# Patient Record
Sex: FEMALE | Race: WHITE
[De-identification: names, ages, dates, MRNs, and addresses within clinical notes are randomized per-mention and may not be internally consistent; named-entity substitution may affect disease eponyms.]

---

## 2017-09-26 ENCOUNTER — HOSPITAL ENCOUNTER (OUTPATIENT)
Dept: HOSPITAL 75 - RAD | Age: 42
End: 2017-09-26
Attending: OBSTETRICS & GYNECOLOGY
Payer: COMMERCIAL

## 2017-09-26 DIAGNOSIS — Z12.31: Primary | ICD-10-CM

## 2017-09-26 PROCEDURE — 77067 SCR MAMMO BI INCL CAD: CPT

## 2017-09-26 NOTE — DIAGNOSTIC IMAGING REPORT
Bilateral screening mammogram 2D views with tomosynthesis



The current study was also evaluated with a Computer Aided

Detection (CAD) system.



Indication: Screening. No current complaints stated on the

questionnaire.



COMPARISON: 9/27/16



FINDINGS:

The breasts are composed of heterogeneously dense parenchyma

which may decrease mammographic sensitivity. There is no mass,

architectural distortion or suspicious cluster of calcification

seen. Allowing for technique and positional differences, no

suspicious change is seen.



IMPRESSION: Dense breasts with no definite change. 



ACR BI-RADS Category 2: Benign findings.

Result letter will be mailed to the patient.

Note: At least 10% of breast cancer is not imaged by mammography.



 



Dictated by: 



  Dictated on workstation # [id number]

## 2018-10-09 ENCOUNTER — HOSPITAL ENCOUNTER (OUTPATIENT)
Dept: HOSPITAL 75 - RAD | Age: 43
End: 2018-10-09
Attending: OBSTETRICS & GYNECOLOGY
Payer: COMMERCIAL

## 2018-10-09 DIAGNOSIS — Z12.31: Primary | ICD-10-CM

## 2018-10-09 PROCEDURE — 77067 SCR MAMMO BI INCL CAD: CPT

## 2018-10-09 NOTE — DIAGNOSTIC IMAGING REPORT
INDICATION: Screening.



The current study was also evaluated with a Computer Aided

Detection (CAD) system. 3-D tomosynthesis was also performed and

reviewed.



COMPARISON: Comparison made with prior examinations from

09/26/2017 back through 08/27/2013.



FINDINGS: The fibroglandular tissue is heterogeneously dense

bilaterally. There is no new dominant mass, spiculated lesion, or

suspicious calcifications identified. The skin, nipples, and

axillae are unremarkable.



IMPRESSION: Benign.



ACR BI-RADS Category 2: Benign findings.

Result letter will be mailed to the patient.

Note: At least 10% of breast cancer is not imaged by mammography.



Dictated by: 



  Dictated on workstation # [id number]

## 2019-10-15 ENCOUNTER — HOSPITAL ENCOUNTER (OUTPATIENT)
Dept: HOSPITAL 75 - RAD | Age: 44
End: 2019-10-15
Attending: OBSTETRICS & GYNECOLOGY
Payer: COMMERCIAL

## 2019-10-15 DIAGNOSIS — Z12.31: Primary | ICD-10-CM

## 2019-10-15 PROCEDURE — 77067 SCR MAMMO BI INCL CAD: CPT

## 2019-10-15 NOTE — DIAGNOSTIC IMAGING REPORT
INDICATION: 

Routine screening.



COMPARISON:     

10/09/2018 and 09/26/2017.



TECHNIQUE: 

2D and 3D bilateral screening mammography was performed with CAD.



FINDINGS:

Both breasts remain heterogeneously dense, limiting the

sensitivity of mammography. The overall parenchymal pattern is

stable. No dominant mass or malignant appearing

microcalcifications are seen. A density in the medial aspect of

the right breast at mid depth on the CC view has been stable for

several years. There appears to be an intraparenchymal lymph node

in the upper outer left breast at posterior depth. The axillae

are unremarkable.



IMPRESSION: 

No mammographic features suspicious for malignancy are

identified.



ACR BI-RADS Category 2: Benign findings.

Result letter will be mailed to the patient.

Note: At least 10% of breast cancer is not imaged by mammography.



Dictated by: 



  Dictated on workstation # [id number]

## 2020-06-29 ENCOUNTER — HOSPITAL ENCOUNTER (OUTPATIENT)
Dept: HOSPITAL 75 - PREOP | Age: 45
Discharge: HOME | End: 2020-06-29
Attending: SURGERY
Payer: COMMERCIAL

## 2020-06-29 VITALS — WEIGHT: 175.27 LBS | BODY MASS INDEX: 29.92 KG/M2 | HEIGHT: 64.02 IN

## 2020-06-29 DIAGNOSIS — Z01.812: Primary | ICD-10-CM

## 2020-06-29 DIAGNOSIS — Z20.828: ICD-10-CM

## 2020-06-29 DIAGNOSIS — Z80.0: ICD-10-CM

## 2020-06-29 PROCEDURE — 87635 SARS-COV-2 COVID-19 AMP PRB: CPT

## 2020-07-01 ENCOUNTER — HOSPITAL ENCOUNTER (OUTPATIENT)
Dept: HOSPITAL 75 - ENDO | Age: 45
Discharge: HOME | End: 2020-07-01
Attending: SURGERY
Payer: COMMERCIAL

## 2020-07-01 VITALS — SYSTOLIC BLOOD PRESSURE: 94 MMHG | DIASTOLIC BLOOD PRESSURE: 46 MMHG

## 2020-07-01 VITALS — SYSTOLIC BLOOD PRESSURE: 101 MMHG | DIASTOLIC BLOOD PRESSURE: 51 MMHG

## 2020-07-01 VITALS — SYSTOLIC BLOOD PRESSURE: 98 MMHG | DIASTOLIC BLOOD PRESSURE: 51 MMHG

## 2020-07-01 VITALS — SYSTOLIC BLOOD PRESSURE: 114 MMHG | DIASTOLIC BLOOD PRESSURE: 56 MMHG

## 2020-07-01 VITALS — BODY MASS INDEX: 30.29 KG/M2 | HEIGHT: 63.86 IN | WEIGHT: 175.27 LBS

## 2020-07-01 VITALS — SYSTOLIC BLOOD PRESSURE: 124 MMHG | DIASTOLIC BLOOD PRESSURE: 64 MMHG

## 2020-07-01 VITALS — SYSTOLIC BLOOD PRESSURE: 98 MMHG | DIASTOLIC BLOOD PRESSURE: 57 MMHG

## 2020-07-01 VITALS — SYSTOLIC BLOOD PRESSURE: 103 MMHG | DIASTOLIC BLOOD PRESSURE: 51 MMHG

## 2020-07-01 VITALS — DIASTOLIC BLOOD PRESSURE: 59 MMHG | SYSTOLIC BLOOD PRESSURE: 116 MMHG

## 2020-07-01 VITALS — DIASTOLIC BLOOD PRESSURE: 54 MMHG | SYSTOLIC BLOOD PRESSURE: 110 MMHG

## 2020-07-01 VITALS — DIASTOLIC BLOOD PRESSURE: 61 MMHG | SYSTOLIC BLOOD PRESSURE: 117 MMHG

## 2020-07-01 VITALS — SYSTOLIC BLOOD PRESSURE: 97 MMHG | DIASTOLIC BLOOD PRESSURE: 55 MMHG

## 2020-07-01 VITALS — SYSTOLIC BLOOD PRESSURE: 111 MMHG | DIASTOLIC BLOOD PRESSURE: 57 MMHG

## 2020-07-01 DIAGNOSIS — E78.00: ICD-10-CM

## 2020-07-01 DIAGNOSIS — Z80.0: ICD-10-CM

## 2020-07-01 DIAGNOSIS — E03.9: ICD-10-CM

## 2020-07-01 DIAGNOSIS — Z79.890: ICD-10-CM

## 2020-07-01 DIAGNOSIS — N80.9: ICD-10-CM

## 2020-07-01 DIAGNOSIS — Z79.899: ICD-10-CM

## 2020-07-01 DIAGNOSIS — Z12.11: Primary | ICD-10-CM

## 2020-07-01 RX ADMIN — MIDAZOLAM PRN MG: 1 INJECTION INTRAMUSCULAR; INTRAVENOUS at 10:56

## 2020-07-01 RX ADMIN — MIDAZOLAM PRN MG: 1 INJECTION INTRAMUSCULAR; INTRAVENOUS at 10:50

## 2020-07-01 RX ADMIN — FENTANYL CITRATE ONE MCG: 50 INJECTION, SOLUTION INTRAMUSCULAR; INTRAVENOUS at 10:51

## 2020-07-01 RX ADMIN — MIDAZOLAM PRN MG: 1 INJECTION INTRAMUSCULAR; INTRAVENOUS at 10:53

## 2020-07-01 RX ADMIN — MIDAZOLAM PRN MG: 1 INJECTION INTRAMUSCULAR; INTRAVENOUS at 11:00

## 2020-07-01 RX ADMIN — MIDAZOLAM PRN MG: 1 INJECTION INTRAMUSCULAR; INTRAVENOUS at 11:04

## 2020-07-01 NOTE — PROGRESS NOTE-POST OPERATIVE
Post-Operative Progess Note


Surgeon (s)/Assistant (s)


Surgeon


DARYL CLOUD MD


Assistant:  none





Pre-Operative Diagnosis


screening colo, family hx





Post-Operative Diagnosis





normal colon and rectum





Procedure & Operative Findings


Date of Procedure


7/1/20


Procedure Performed/Findings


colonoscopy


Anesthesia Type


cs





Estimated Blood Loss


Estimated blood loss (mL):  minimal





Specimens/Packing


Specimens Removed


none











DARYL CLOUD MD                 Jul 1, 2020 11:24

## 2020-07-01 NOTE — DISCHARGE INST-SURGICAL
D/C Lap Instructions-PEDRO LUIS


Follow Up Appt in 2 weeks





Activity as tolerated








High Fiber Diet 25g or more per day





Avoid Alcohol, Caffeine, Spicy Greasy and Acid foods.





Drink 64 fluid oz or more of fluids per day.





Symptoms to Report: Fever over 101 degree F, Nausea/Vomiting 


If any problems/questions: Contact your physician or go to Emergency Room











DARYL CLOUD MD                 Jul 1, 2020 10:06

## 2020-07-01 NOTE — CONSCIOUS SEDATION/ASA
Conscious Sedation Pre-Proced


Time


09:00





ASA Score


2


For ASA 3 and 4: Consider anesthesia and medical clearance. Also, for patients

with a history of failed moderate sedation consider anesthesia.

















Airway 


 


Lungs 


 


Heart 


 


 ASA score


 


 ASA 1: a normal healthy patient


 


 ASA 2:  a patient with a mild systemic disease (mid diabetes, controlled 

hypertension, obesity 


 


 ASA 3:  a patient with a severe systemic disease that limits activity  (angina,

COPD, prior Myocardial infarction)


 


 ASA 4:  a patient with an incapacitating disease that is a constant threat to 

life (CHF, renal failure)


 


 ASA 5:  a moribund patient not expected to survive 24 hrs.  (ruptured aneurysm)


 


 ASA 6:  a declared brain-dead patient whose organs are being harvested.


 


 For emergent operations, add the letter E after the classification











Mallampati Classification


Grade 2





Sedation Plan


Analgesia, Amnesia, Plan communicated to team members, Discussed options with 

patient/fam, Discussed risks with patient/fam


The patient is an appropriate candidate to undergo the planned procedure, 

sedation, and anesthesia.





The patient immediately re-assessed prior to indication.











DARYL CLOUD MD                 Jul 1, 2020 10:04

## 2020-07-01 NOTE — OPERATIVE REPORT
DATE OF SERVICE:  07/01/2020



PREOPERATIVE DIAGNOSIS:

Screening colonoscopy with family history of colon cancer.



POSTOPERATIVE DIAGNOSIS:

Normal colon and rectum.



PROCEDURE:

Colonoscopy.



SURGEON:

Daryl Cloud MD.



ANESTHESIA:

Conscious sedation.



ESTIMATED BLOOD LOSS:

Minimal.



FINDINGS:

Normal colon and rectum.



DISPOSITION:

The patient tolerated the procedure well.



INDICATIONS:

The patient is a 44-year-old female who states that she did have a colonoscopy

approximately 15 years ago.  At that time, she was having crampy abdominal pain

and was diagnosed with endometriosis.  She does report since that time, she has

had a first-degree family member diagnosed with colon cancer with her father

having the disease at age 65.  She states that she is otherwise doing well, does

not report any major issues with diarrhea nor constipation as well as no red

blood per rectum nor any dark tarry stools.



DESCRIPTION OF PROCEDURE:

The patient was brought to the endoscopy suite, laid in the left lateral

decubitus position.  After adequate IV pain and sedative medications and

conscious sedation anesthesia, digital rectal examination was performed.  No

major hemorrhoids were identified.  Normal sphincter tone was felt and there

were no palpable masses.



The endoscope was then intubated to the anus and rectum gently insufflated.  The

endoscope was then advanced through the valves of Ruiz of the rectum with no

polyps or any neoplasms identified.  Through the sigmoid colon, no

diverticulosis identified.  The endoscope was then advanced to the remainder of

the descending, transverse and ascending colon to the cecum.  These segments

were normal.  There were no polyps or any neoplasms identified throughout the

colon or rectum.  The endoscope was then slowly withdrawn while taking a second

look and suctioning of residual air with no additional findings.



The patient tolerated the procedure well.  We will recommend medical management

with a high fiber diet with at least 25 grams of fiber daily as well as

significant amounts of water to promote soft stools on a daily basis.  Due to

her first-degree family history of colon cancer, we will recommend followup

colonoscopies approximately every 5 years.





Job ID: [id number]

DocumentID: [id number]

Dictated Date:  07/01/2020 11:19:23

Transcription Date: 07/01/2020 20:08:00

Dictated By: DARYL CLOUD MD

## 2020-07-01 NOTE — XMS REPORT
Continuity of Care Document

                             Created on: 2020



KAITLIN NGUYEN

External Reference #: [id number]

: 1975

Sex: Female



Demographics





                          Address                   [facility] [place]  [address]

 

                          Home Phone                [phone] x

 

                          Preferred Language        Unknown

 

                          Marital Status            Unknown

 

                          Restorationist Affiliation     Unknown

 

                          Race                      Unknown

 

                          Ethnic Group              Unknown





Author





                          Organization              Unknown

 

                          Address                   Unknown

 

                          Phone                     Unavailable



              



Allergies

      



             Active           Description           Code           Type         

  Severity   

                Reaction           Onset           Reported/Identified          

 

Relationship to Patient                 Clinical Status        

 

                Yes             No Known Drug Allergies           [id number]    

       Drug 

Allergy           Unknown           N/A                             2020  

      

                                                             



                  



Medications

      



There is no data.                  



Problems

      



             Date Dx Coded           Attending           Type           Code    

       

Diagnosis                               Diagnosed By        

 

                2015           CARIDAD YADAV MD, Ot     

         V76.12    

                                                             

 

                2016           CARIDAD YADAV MD, Ot     

         V76.12    

                          OTH SCREEN MAMMO-MALIGN NEOPLASM OF TANIA              

      

 

                2016           CARIDAD YADAV MD, Ot     

         V76.12    

                          OTH SCREEN MAMMO-MALIGN NEOPLASM OF TANIA              

      

 

                2016           CARIDAD YADAV MD           Ot     

         V76.12    

                          OTH SCREEN MAMMO-MALIGN NEOPLASM OF TANIA              

      

 

                10/12/2016           ACRIDAD YADAV MD, Ot     

         Z12.31    

                          ENCNTR SCREEN MAMMOGRAM FOR MALIGNANT NE              

      

 

                2017           CARIDAD YADAV MD           Ot     

         V76.12    

                          OTH SCREEN MAMMO-MALIGN NEOPLASM OF TANIA              

      

 

                2017           CARIDAD YADAV MD           Ot     

         V76.12    

                          OTH SCREEN MAMMO-MALIGN NEOPLASM OF TANIA              

      

 

                2017           CARIDAD YADAV MD           Ot     

         V76.12    

                          OTH SCREEN MAMMO-MALIGN NEOPLASM OF TANIA              

      

 

                2017           CARIDAD YADAV MD, Ot     

         Z12.31    

                          ENCNTR SCREEN MAMMOGRAM FOR MALIGNANT NE              

      

 

                2017           CARIDAD YADAV MD, Ot     

         Z12.31    

                          ENCNTR SCREEN MAMMOGRAM FOR MALIGNANT NE              

      

 

                10/04/2017           CARIDAD YADAV MD, Ot     

         Z12.31    

                          ENCNTR SCREEN MAMMOGRAM FOR MALIGNANT NE              

      

 

                2017           CARIDAD YADAV MD           Ot     

         V76.12    

                          OTH SCREEN MAMMO-MALIGN NEOPLASM OF TANIA              

      

 

                2017           CARIDAD YADAV MD, Ot     

         V76.12    

                          OTH SCREEN MAMMO-MALIGN NEOPLASM OF TANIA              

      

 

                2017           CARIDAD YADAV MD, Ot     

         V76.12    

                          OTH SCREEN MAMMO-MALIGN NEOPLASM OF TANIA              

      

 

                2017           CARIDAD YADAV MD, Ot     

         Z12.31    

                          ENCNTR SCREEN MAMMOGRAM FOR MALIGNANT NE              

      

 

                2017           CARIDAD YADAV MD, Ot     

         Z12.31    

                          ENCNTR SCREEN MAMMOGRAM FOR MALIGNANT NE              

      

 

                10/10/2018           CARIDAD YADAV MD, Ot     

         Z12.31    

                          ENCNTR SCREEN MAMMOGRAM FOR MALIGNANT NE              

      

 

                10/15/2019           CARIDAD YADAV MD, Ot     

         V76.12    

                          OTH SCREEN MAMMO-MALIGN NEOPLASM OF TANIA              

      

 

                10/15/2019           CARIDAD YADAV MD, Ot     

         V76.12    

                          OTH SCREEN MAMMO-MALIGN NEOPLASM OF TANIA              

      

 

                10/15/2019           CARIDAD YADAV MD           Ot     

         Z12.31    

                          ENCNTR SCREEN MAMMOGRAM FOR MALIGNANT NE              

      

 

                10/15/2019           CARIDAD YADAV MD           Ot     

         Z12.31    

                          ENCNTR SCREEN MAMMOGRAM FOR MALIGNANT NE              

      

 

                10/15/2019           CARIDAD YADAV MD, Ot     

         Z12.31    

                          ENCNTR SCREEN MAMMOGRAM FOR MALIGNANT NE              

      

 

                10/17/2019           CARIDAD YADAV MD, Ot     

         Z12.31    

                          ENCNTR SCREEN MAMMOGRAM FOR MALIGNANT NE              

      



                                                              



Procedures

      



There is no data.                  



Results

      



There is no data.              



Encounters

      



                ACCT No.           Visit Date/Time           Discharge          

 Status         

             Pt. Type           Provider           Facility           Loc./Unit 

          

Complaint        

 

                    [id number]           2020 05:30:00           

020 09:48:00        

                DIS             Outpatient           DARYL CLOUD MD           

[facility]           PREOP                     COLONOSCOPY        

 

                    [id number]           10/15/2019 07:51:00           10/15/2

019 23:59:59        

                CLS             Outpatient           CARIDAD YADAV MD  

         [facility] 

[facility]           RAD                       SCREENING      

  

 

                    [id number]           10/09/2018 08:11:00           10/09/2

018 23:59:59        

                CLS             Outpatient           CARIDAD YADAV MD  

         [facility] 

[facility]           RAD                       ROUTINE        

 

                    [id number]           2017 09:27:00           

017 23:59:59        

                CLS             Outpatient           CARIDAD YADAV MD  

         [facility] 

[facility]           RAD                       SCREENING      

  

 

                    [id number]           2016 08:52:00           

016 23:59:59        

                CLS             Outpatient           CARIDAD YADAV MD  

         [facility] 

[facility]           RAD                       ROUTINE        

 

                    [id number]           2015 12:52:00           

015 23:59:59        

                CLS             Outpatient           CARIDAD YADAV MD  

         [facility] 

[facility]           RAD                       SCREENING      

  

 

                    [id number]           2014 09:25:00           

014 23:59:59        

                CLS             Outpatient           CARIDAD YADAV MD  

         [facility] 

[facility]           RAD                       SCREENING      

  

 

                    [id number]           2013 09:02:00           

013 23:59:59        

                CLS             Outpatient           CARIDAD YDAAV MD  

         [facility] 

[facility]           RAD                       SCREENING      

  

 

             [id number]           2020 10:30:00                        P

DARYL Tovar MD           [facility]  

                          ENDO                      SCREENING/FAMILY HX COLON CA

## 2020-07-01 NOTE — XMS REPORT
Patient Summarization Differential

                             Created on: 2020



KAITLIN NGUYEN

External Reference #: [id number]

: 1975

Sex: Female



Demographics





                          Address                   [address] [facility] [address] Phone                [phone]

 

                          Preferred Language        Unknown

 

                          Marital Status            Unknown

 

                          Gnosticism Affiliation     Unknown

 

                          Race                      White

 

                          Ethnic Group              Unknown





Author





                          Author                    "[employer] [facility]

 

                          Address                   [address]  [address]



 

                          Phone                     [phone]







Care Team Providers





                    Care Team Member Name Role                Phone

 

                    CARIDAD YADAV MD Unavailable         Unavailable

 

                          Unavailable               Unavailable

 

                          Unavailable               Unavailable



                                            



Allergies

          No Information                                                        
 



Medications

          No Information                                                        
 



Problems

                      



      



           Problem   Normalized  Date Last  Normalized  Normalized  Provider  Fa

cility



              Classification  Problem(s)   Recorded     Problem      Problem Sta

tus  



                                         Duration   

 

      



            Unclassified  Encounter for   Episodic   Active     CARIDAD     Not A

vailable



                 (14 sources.)   screening       [facility]IC[facility] ,  ([id number])



                           mammogram for             MD 



                                         malignant     



                                         neoplasm of     



                                         breast     



                                         Translations:     



                                         [ OTH SCREEN     



                                         MAMMO-MALIGN     



                                         NEOPLASM OF     



                                         TANIA]     



                                                                              



Procedures

          No Information                                                        
 



Immunizations

          No Information                                                        
 



Results

          No Information                                                        
 



Vital Signs

          No Information                                                        
 



Interventions

          No Information                                                        
 



Plan of Treatment

          No Information                                                        
 



Goals

          No Information                                                        
 



Social History

          No Information                                                        
 



Functional Status

          No Information                                                        
 



Mental Status

          No Information                                                        
 



Encounters

                      



    



              Encounter    Normalized Encounter  Encounter Diagnosis  Care Provi

zehra  

Organization



                           Date                      Type   

 

    



              10-   Patient encounter  no information  no name      no or

ganization name

 

    



              NEGATED      Patient encounter  no information  no name      no or

ganization name



                                         2017   Patient encounter  no information  no name      no or

ganization name

 

    



              2014   Patient encounter  no information  no name      no or

ganization name

 

    



              2013   Patient encounter  no information  no name      no or

ganization name

 

    



              10-   Patient encounter  no information  no name      no or

ganization name



                                         procedure   

 

    



              10-   Patient encounter  no information  no name      no or

ganization name



                                         procedure   

 

    



              2017   Patient encounter  no information  no name      no or

ganization name



                                         procedure   

 

    



              2016   Patient encounter  no information  no name      no or

ganization name



                                         procedure   

 

    



              2015   Patient encounter  no information  no name      no or

ganization name



                                         procedure   

 

    



              2014   Patient encounter  no information  no name      no or

ganization name



                                         procedure   



                                                                                
                                                                                
                



Medical Equipment

          No Information                                                        
 



Payers

          No Information                                                



Additional Source Comments

          This clinical document has been generated using [employer] 
software that has been certified by the Office of the National Coordinator for 
Health Information Technology (ONC 15.99.04.3023.Diam.31.00.0.325161) and the 
National Committee for  (NCQA, as an eMeasure certified 
technology).            

            

FOR RECORDS PERTAINING TO PATIENTS WHO ARE OR HAVE BEEN ENROLLED IN A CHEMICAL D
EPENDENCY/SUBSTANCE ABUSE PROGRAM, SOME INFORMATION MAY BE OMITTED. This clinica
l summary was aggregated from multiple sources.  Caution should be exercised in 
using it in the provision of clinical care. This summary normalizes information 
from multiple sources, and as a consequence, information in this document may ma
terially change the coding, format and clinical context of patient data. In nelson
tion, data may be omitted in some cases. CLINICAL DECISIONS SHOULD BE BASED ON T
HE PRIMARY CLINICAL RECORDS. [facility] [employer] [facility]. provides no warranty or guara
ntee of the accuracy or completeness of information in this document.The followi
 information is based on time limited clinical information

## 2020-10-19 ENCOUNTER — HOSPITAL ENCOUNTER (OUTPATIENT)
Dept: HOSPITAL 75 - RAD | Age: 45
End: 2020-10-19
Attending: OBSTETRICS & GYNECOLOGY
Payer: COMMERCIAL

## 2020-10-19 DIAGNOSIS — Z12.31: Primary | ICD-10-CM

## 2020-10-19 PROCEDURE — 77067 SCR MAMMO BI INCL CAD: CPT

## 2020-10-19 PROCEDURE — 77063 BREAST TOMOSYNTHESIS BI: CPT

## 2020-10-19 NOTE — DIAGNOSTIC IMAGING REPORT
EXAMINATION:

Digital mammogram bilateral screening with CAD.



INDICATION: 

Screening.



COMPARISON: 

This study was compared to the prior exams of 10/15/2019,

10/09/2018, and 09/26/2017.



PERSONAL HISTORY: 

At this time, there are no current complaints.



FINDINGS: 

The fibroglandular tissue in both breasts is heterogeneously

dense. This does limit the sensitivity of this exam. Overall,

there does not appear to have been any significant change when

compared to the prior study. No primary or secondary sign of

malignancy is noted.



IMPRESSION: 

There is no radiographic evidence for malignancy.



ACR BI-RADS Category 1: Negative.

Result letter will be mailed to the patient.

Note:  At least 10% of breast cancer is not imaged by

mammography.



Dictated by: 



  Dictated on workstation # [id number]

## 2021-11-16 ENCOUNTER — HOSPITAL ENCOUNTER (OUTPATIENT)
Dept: HOSPITAL 75 - RAD | Age: 46
End: 2021-11-16
Attending: OBSTETRICS & GYNECOLOGY
Payer: COMMERCIAL

## 2021-11-16 DIAGNOSIS — Z12.31: Primary | ICD-10-CM

## 2021-11-16 PROCEDURE — 77067 SCR MAMMO BI INCL CAD: CPT

## 2021-11-16 PROCEDURE — 77063 BREAST TOMOSYNTHESIS BI: CPT

## 2021-11-16 NOTE — DIAGNOSTIC IMAGING REPORT
INDICATION: 

Routine screening.



COMPARISON:       

10/19/2020 and 10/15/2019.



TECHNIQUE: 

2D and 3D bilateral screening mammography was performed with CAD.



FINDINGS:

Both breasts are heterogeneously dense, limiting the sensitivity

of mammography. No mass or malignant-appearing

microcalcifications are seen. The axillae are unremarkable.



IMPRESSION: 

No mammographic features suspicious for malignancy are

identified.



ACR BI-RADS Category 1: Negative.

Result letter will be mailed to the patient.

Note:  At least 10% of breast cancer is not imaged by

mammography.



Dictated by: 



  Dictated on workstation # [id number]

## 2023-01-06 ENCOUNTER — HOSPITAL ENCOUNTER (OUTPATIENT)
Dept: HOSPITAL 75 - RAD | Age: 48
End: 2023-01-06
Attending: OBSTETRICS & GYNECOLOGY
Payer: COMMERCIAL

## 2023-01-06 DIAGNOSIS — Z12.31: Primary | ICD-10-CM

## 2023-01-06 PROCEDURE — 77063 BREAST TOMOSYNTHESIS BI: CPT

## 2023-01-06 PROCEDURE — 77067 SCR MAMMO BI INCL CAD: CPT

## 2023-01-06 NOTE — DIAGNOSTIC IMAGING REPORT
INDICATION: Routine screening.



Comparison is made with prior mammogram from 11/16/2021 and

10/19/2020.



2-D and 3-D bilateral screening mammography was performed with

CAD.



Both breasts are heterogeneously dense, limiting the sensitivity

of mammography. A benign nodule in the outer left breast is

stable. No mass or malignant-appearing microcalcifications are

seen. Axillae are unremarkable.



IMPRESSION:  No mammographic features suspicious for malignancy

are identified.



ACR BI-RADS Category 2: Benign findings.

Result letter will be mailed to the patient.

Note: At least 10% of breast cancer is not imaged by mammography.



BI-RADS Category 2



Dictated by: 



  Dictated on workstation # [id number]